# Patient Record
Sex: MALE | Race: WHITE | NOT HISPANIC OR LATINO | URBAN - METROPOLITAN AREA
[De-identification: names, ages, dates, MRNs, and addresses within clinical notes are randomized per-mention and may not be internally consistent; named-entity substitution may affect disease eponyms.]

---

## 2019-02-08 ENCOUNTER — IMPORTED ENCOUNTER (OUTPATIENT)
Dept: URBAN - METROPOLITAN AREA CLINIC 43 | Facility: CLINIC | Age: 84
End: 2019-02-08

## 2019-02-08 PROBLEM — H25.013: Noted: 2019-02-08

## 2019-02-08 PROBLEM — E11.9: Noted: 2019-02-08

## 2019-02-08 PROBLEM — H25.13: Noted: 2019-02-08

## 2019-02-15 ENCOUNTER — IMPORTED ENCOUNTER (OUTPATIENT)
Dept: URBAN - METROPOLITAN AREA CLINIC 43 | Facility: CLINIC | Age: 84
End: 2019-02-15

## 2019-02-15 PROBLEM — H25.013: Noted: 2019-02-15

## 2019-02-15 PROBLEM — H25.13: Noted: 2019-02-15

## 2019-03-07 ENCOUNTER — IMPORTED ENCOUNTER (OUTPATIENT)
Dept: URBAN - METROPOLITAN AREA CLINIC 43 | Facility: CLINIC | Age: 84
End: 2019-03-07

## 2019-03-07 PROBLEM — Z96.1: Noted: 2019-03-07

## 2019-03-12 ENCOUNTER — IMPORTED ENCOUNTER (OUTPATIENT)
Dept: URBAN - METROPOLITAN AREA CLINIC 43 | Facility: CLINIC | Age: 84
End: 2019-03-12

## 2019-03-12 PROBLEM — H25.011: Noted: 2019-03-12

## 2019-03-12 PROBLEM — Z96.1: Noted: 2019-03-12

## 2020-04-19 ASSESSMENT — KERATOMETRY
OS_K2POWER_DIOPTERS: 42.75
OS_AXISANGLE2_DEGREES: 80
OS_K1POWER_DIOPTERS: 43
OD_AXISANGLE_DEGREES: 15
OS_K2POWER_DIOPTERS: 43.5
OS_AXISANGLE_DEGREES: 70
OS_K1POWER_DIOPTERS: 42.75
OD_K2POWER_DIOPTERS: 43.5
OS_AXISANGLE2_DEGREES: 160
OD_AXISANGLE2_DEGREES: 105
OD_K1POWER_DIOPTERS: 43.25
OS_AXISANGLE_DEGREES: 170

## 2020-04-19 ASSESSMENT — VISUAL ACUITY
OD_OTHER: 20/200.
OS_SC: J7
OS_PH: 20/100+
OD_OTHER: 20/200.
OS_SC: 20/60+1
OD_SC: 20/70+1
OS_OTHER: 20/400.
OS_SC: 20/100+1
OD_CC: J2
OS_SC: 20/70
OS_CC: J3
OD_SC: 20/50
OD_SC: J10
OD_CC: 20/40 -2
OD_SC: J5-1
OD_CC: J2
OS_CC: 20/50 +1
OS_CC: J3
OD_SC: 20/50
OS_SC: 20/70
OS_SC: J7
OD_OTHER: 20/200.
OS_OTHER: 20/400.
OD_SC: J10
OS_SC: J7

## 2020-04-19 ASSESSMENT — TONOMETRY
OS_IOP_MMHG: 17.0
OD_IOP_MMHG: 21.0
OD_IOP_MMHG: 20.0
OS_IOP_MMHG: 21.0
OS_IOP_MMHG: 14.0

## 2020-08-31 NOTE — PATIENT DISCUSSION
Briefly discussed ROF IOL's at the time of surgery if surgical testing shows patient is a good candidate. Packet of information given to patient today.

## 2020-08-31 NOTE — PATIENT DISCUSSION
Patient wears multifocal contact lenses, trial lenses given to patient today. Advised patient to trial contact lenses and if no improvement in vision patient can consider surgery. Advised patient to call Yovany Number with the results of the contact lens trials.

## 2020-08-31 NOTE — PATIENT DISCUSSION
Patient wears multifocal contact lenses, trial lenses given to patient today. Advised patient to trial contact lenses and if no improvement in vision patient can consider surgery. Advised patient to call Nehemias Majano with the results of the contact lens trials.

## 2020-08-31 NOTE — PATIENT DISCUSSION
Advised patient that cataracts are moderate and are ready to be removed at any point moving forward.

## 2020-08-31 NOTE — PATIENT DISCUSSION
Patient wears multifocal contact lenses, trial lenses given to patient today. Advised patient to trial contact lenses and if no improvement in vision patient can consider surgery. Advised patient to call Suni Quiles with the results of the contact lens trials.

## 2020-09-11 NOTE — PATIENT DISCUSSION
Patient wears multifocal contact lenses, trial lenses given to patient today. Advised patient to trial contact lenses and if no improvement in vision patient can consider surgery. Advised patient to call Selma Liang with the results of the contact lens trials.

## 2020-10-09 NOTE — PATIENT DISCUSSION
Patient wears multifocal contact lenses, trial lenses given to patient today. Advised patient to trial contact lenses and if no improvement in vision patient can consider surgery. Advised patient to call Kendall Pena with the results of the contact lens trials.

## 2020-10-09 NOTE — PATIENT DISCUSSION
Patient wears multifocal contact lenses, trial lenses given to patient today. Advised patient to trial contact lenses and if no improvement in vision patient can consider surgery. Advised patient to call Tricia Howell with the results of the contact lens trials.

## 2021-05-28 NOTE — PATIENT DISCUSSION
Patient wears multifocal contact lenses, trial lenses given to patient today. Advised patient to trial contact lenses and if no improvement in vision patient can consider surgery. Advised patient to call Oralia Roman with the results of the contact lens trials.

## 2021-12-27 NOTE — PATIENT DISCUSSION
Discussed pt's lifestyle and [facility] expectations with surgery. Pt wishes to be free from glasses as much as possible. Hx MF contacts .

## 2021-12-27 NOTE — PATIENT DISCUSSION
The patient expressed a desire to see through the full range of vision from distance, to middle, to near without glasses. The limitations of advanced lens technology were reviewed and the recommendation was made for an extended depth of focus lens in combination with a multifocal lens. Patient understands that each lens will provide only 2 of the 3 ranges of vision. Side effects, specifically halos, reduced contrast, and a possibility of IOL exchange due to failure to adapt to the lens were discussed as was the need for enhancement in some cases. The patient elects to proceed with Blended MFIOL's .

## 2021-12-27 NOTE — PATIENT DISCUSSION
ADDENDUM: AFTER FURTHER REVIEW OF LENS SEGURA. VIVITY IOL NOT AVAILABLE IN THIS LENS POWER. DISCUSSED OPTION WITH PT AND GIVEN MF CL HX PT PREFERS MFIOL AND USE READERS FOR FINE PRINT.

## 2021-12-27 NOTE — PATIENT DISCUSSION
The types of intraocular lenses were reviewed with the patient along with a discussion of their various strengths and weaknesses. CUSTOM , BASIC, BASIC PLUS and COSMETIC IOL's.

## 2021-12-27 NOTE — PATIENT DISCUSSION
The patient expressed a desire to see through the full range of vision from distance, to middle, to near without glasses. The limitations of advanced lens technology were reviewed and the recommendation was made for an extended depth of focus lens (PANOPTIX) in combination with a multifocal lens. Patient understands that each lens will provide only 2 of the 3 ranges of vision. Side effects, specifically halos, reduced contrast, and a 1 in 500 exchange rate due to failure to adapt to the lens were discussed as was the need for enhancement in some cases. The patient elects to proceed with Blended MFIOL's .

## 2022-01-06 NOTE — PATIENT DISCUSSION
Patient suffered a cardiac event after cataract surgery, later on. .  Patient was told by cardiac nurse that it could have been from ophthalmic steroid use.   Advised patient that is not likely the case, however advised patient to occlude his punctum and slowing the absorption of the ocular medication by compressing the lower lid in the corner near his nose for a couple of minutes.       patient is supposed to follow up with cardiologist tomorrow and possibly shocked back into sinus rhythm.

## 2022-01-12 NOTE — PATIENT DISCUSSION
The risks, benefits and alternatives of cataract surgery were discussed with the patient. Risks including but not limited to: Infection, retinal detachment, lens dislocation, inflammation, loss of vision, increased pressure and need for further surgery. We discussed all the lens options including monofocal lens, toric lens, multifocal lens, astigmatism correction and other options. The patient understands that they may need glasses for optimal vision with any option ie distance.

## 2022-02-23 NOTE — PATIENT DISCUSSION
Recommend patient try Systane nighttime gel before bed. patient does use a CPAP machine at night and has been experiencing dry eye symptoms.

## 2022-05-20 NOTE — PATIENT DISCUSSION
Retinal tear and detachment warning symptoms reviewed and patient instructed to call immediately if increasing floaters, flashes, or decreasing peripheral vision. HO given.

## 2022-06-17 NOTE — PATIENT DISCUSSION
6/17/22: The patient has developed an ACUTE HEMORRHAGIC PVD. No holes or tears were seen on clinical exam today. Reviewed the signs and symptoms of retinal tear/retinal detachment and the importance calling for prompt evaluation should there be increasing floaters, new flashing lights, or decreasing peripheral vision in either eye at any time. Close follow up is recommended due to higher risk or a tear in a hemorrhagic PVD.

## 2023-12-21 NOTE — PATIENT DISCUSSION
Patient suffered a cardiac event after cataract surgery, later on. .  Patient was told by cardiac nurse that it could have been from ophthalmic steroid use.   Advised patient that is not likely the case, however advised patient to occlude his punctum and slowing the absorption of the ocular medication by compressing the lower lid in the corner near his nose for a couple of minutes.       patient is supposed to follow up with cardiologist tomorrow and possibly shocked back into sinus rhythm. normal...

## 2024-10-17 NOTE — PATIENT DISCUSSION
The risks, benefits and alternatives of cataract surgery were discussed with the patient. Risks including but not limited to: Infection, retinal detachment, lens dislocation, inflammation, loss of vision, increased pressure and need for further surgery. We discussed all the lens options including monofocal lens, toric lens, multifocal lens, astigmatism correction and other options. The patient understands that they may need glasses for optimal vision with any option ie distance. 2 (mild pain)